# Patient Record
(demographics unavailable — no encounter records)

---

## 2025-06-23 NOTE — PHYSICAL EXAM
[Chaperoned Physical Exam] : A chaperone was present in the examining room during all aspects of the physical examination. [MA] : MA [Examination Of The Breasts] : a normal appearance [No Masses] : no breast masses were palpable [Labia Majora] : normal [Labia Minora] : normal [Normal] : normal [Uterine Adnexae] : non-palpable [FreeTextEntry2] : Annie

## 2025-06-23 NOTE — HISTORY OF PRESENT ILLNESS
[N] : Patient denies prior pregnancies [Previously active] : previously active [Men] : men [TextBox_4] : 72yo  postmenopausal, presents for an annual gyn exam and denies any vaginal bleeding.  She is still on Evista for her osteopenia under [facility]man's care as Dr. Hay retired.  [Mammogramdate] : 7/18/2024 [PapSmeardate] : 5/1/2023 [BoneDensityDate] : 2023 [ColonoscopyDate] : 11/01745